# Patient Record
Sex: MALE | Race: WHITE | Employment: FULL TIME | URBAN - METROPOLITAN AREA
[De-identification: names, ages, dates, MRNs, and addresses within clinical notes are randomized per-mention and may not be internally consistent; named-entity substitution may affect disease eponyms.]

---

## 2019-02-09 ENCOUNTER — HOSPITAL ENCOUNTER (OUTPATIENT)
Age: 44
Discharge: HOME | End: 2019-02-09
Attending: FAMILY MEDICINE
Payer: COMMERCIAL

## 2019-02-09 VITALS
RESPIRATION RATE: 16 BRPM | TEMPERATURE: 98 F | DIASTOLIC BLOOD PRESSURE: 95 MMHG | OXYGEN SATURATION: 96 % | SYSTOLIC BLOOD PRESSURE: 134 MMHG | HEART RATE: 70 BPM

## 2019-02-09 DIAGNOSIS — J20.9 ACUTE BRONCHITIS, UNSPECIFIED ORGANISM: Primary | ICD-10-CM

## 2019-02-09 PROCEDURE — 99204 OFFICE O/P NEW MOD 45 MIN: CPT

## 2019-02-09 PROCEDURE — 99203 OFFICE O/P NEW LOW 30 MIN: CPT

## 2019-02-09 RX ORDER — AZITHROMYCIN 250 MG/1
TABLET, FILM COATED ORAL
Qty: 6 TABLET | Refills: 0 | Status: SHIPPED | OUTPATIENT
Start: 2019-02-09 | End: 2020-01-27 | Stop reason: ALTCHOICE

## 2019-02-09 NOTE — ED INITIAL ASSESSMENT (HPI)
Patient presents with a cough x 2-3 days. Patient states that he is expectorating a thick-green mucous. Patient states that today he noticed blood tinged mucous. Denies and shortness of breath.

## 2019-02-09 NOTE — ED PROVIDER NOTES
Patient Seen in: [facility]    History   Patient presents with:  Cough/URI    Stated Complaint: Cough    HPI  22-year-old gentleman presents to immediate care with cold cough congestion chest for 3-4 days, productive cough mostl intact distal pulses. Pulmonary/Chest: Effort normal and breath sounds normal. He has no wheezes. He exhibits no tenderness. Abdominal: Soft. Bowel sounds are normal. There is no tenderness. Musculoskeletal: Normal range of motion.    Lymphadenopathy:

## 2020-01-27 ENCOUNTER — HOSPITAL ENCOUNTER (OUTPATIENT)
Age: 45
Discharge: HOME | End: 2020-01-27
Attending: FAMILY MEDICINE
Payer: COMMERCIAL

## 2020-01-27 VITALS
WEIGHT: 255 LBS | TEMPERATURE: 98 F | SYSTOLIC BLOOD PRESSURE: 158 MMHG | BODY MASS INDEX: 36 KG/M2 | HEART RATE: 73 BPM | OXYGEN SATURATION: 100 % | RESPIRATION RATE: 16 BRPM | DIASTOLIC BLOOD PRESSURE: 91 MMHG
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
## 2020-01-27 DIAGNOSIS — S81.852A CAT BITE OF LEFT LOWER LEG, INITIAL ENCOUNTER: Primary | ICD-10-CM

## 2020-01-27 DIAGNOSIS — L03.116 CELLULITIS OF LEFT LOWER EXTREMITY: ICD-10-CM

## 2020-01-27 PROCEDURE — 99213 OFFICE O/P EST LOW 20 MIN: CPT

## 2020-01-27 PROCEDURE — 99214 OFFICE O/P EST MOD 30 MIN: CPT

## 2020-01-27 PROCEDURE — 90471 IMMUNIZATION ADMIN: CPT

## 2020-01-27 RX ORDER — METRONIDAZOLE 500 MG/1
500 TABLET ORAL 3 TIMES DAILY
Qty: 30 TABLET | Refills: 0 | Status: SHIPPED | OUTPATIENT
Start: 2020-01-27

## 2020-01-27 RX ORDER — DOXYCYCLINE HYCLATE 100 MG/1
100 CAPSULE ORAL 2 TIMES DAILY
Qty: 20 CAPSULE | Refills: 0 | Status: SHIPPED | OUTPATIENT
Start: 2020-01-27

## 2020-01-27 NOTE — ED PROVIDER NOTES
Patient Seen in: [facility]      History   Patient presents with:  Bite    Stated Complaint: Cat bite,left leg    HPI    This 77-year-old male presents to the office with complaint of cat bite and scratches to the left lower leg 35.57 kg/m²         Physical Exam    General: WH/WN/WD, in NAD, A and O times 3  HEAD: Normocephalic, atraumatic  EYES: Sclera anicteric,  conjunctiva normal.  HEART: Regular rate and rhythm, no S3, S4 or murmur noted. LUNGS: Clear to ausculation.  No retr DO NOT TAKE WITH MILK OR DAIRY PRODUCTS. Qty: 20 capsule Refills: 0    metRONIDAZOLE (FLAGYL) 500 MG Oral Tab  Take 1 tablet (500 mg total) by mouth 3 (three) times daily. DO NOT DRINK ALCOHOL WHILE TAKING THIS MEDICATION.   Qty: 30 tablet Refills: 0

## 2020-01-27 NOTE — ED INITIAL ASSESSMENT (HPI)
Cat bite and scratch to LLE last Tuesday. Patients own cat Patient believes Tetanus is UTD, Redness and tender around  Site.  Patient states it \"oozes at [employer]

## 2025-05-29 ENCOUNTER — TELEPHONE (OUTPATIENT)
Dept: RHEUMATOLOGY | Age: 50
End: 2025-05-29